# Patient Record
Sex: FEMALE | Race: BLACK OR AFRICAN AMERICAN | NOT HISPANIC OR LATINO | Employment: FULL TIME | URBAN - METROPOLITAN AREA
[De-identification: names, ages, dates, MRNs, and addresses within clinical notes are randomized per-mention and may not be internally consistent; named-entity substitution may affect disease eponyms.]

---

## 2019-05-20 ENCOUNTER — HOSPITAL ENCOUNTER (EMERGENCY)
Facility: HOSPITAL | Age: 24
Discharge: HOME | End: 2019-05-20
Attending: EMERGENCY MEDICINE
Payer: COMMERCIAL

## 2019-05-20 VITALS
BODY MASS INDEX: 38.86 KG/M2 | WEIGHT: 233.25 LBS | RESPIRATION RATE: 18 BRPM | SYSTOLIC BLOOD PRESSURE: 122 MMHG | HEIGHT: 65 IN | DIASTOLIC BLOOD PRESSURE: 86 MMHG | TEMPERATURE: 99 F | OXYGEN SATURATION: 98 % | HEART RATE: 100 BPM

## 2019-05-20 DIAGNOSIS — M65.4 DE QUERVAIN'S TENOSYNOVITIS, RIGHT: Primary | ICD-10-CM

## 2019-05-20 DIAGNOSIS — M25.531 RIGHT WRIST PAIN: ICD-10-CM

## 2019-05-20 PROCEDURE — 99283 EMERGENCY DEPT VISIT LOW MDM: CPT

## 2019-05-20 RX ORDER — DICLOFENAC SODIUM 75 MG/1
75 TABLET, DELAYED RELEASE ORAL 2 TIMES DAILY
Qty: 20 TABLET | Refills: 0 | Status: SHIPPED | OUTPATIENT
Start: 2019-05-20

## 2019-05-20 RX ORDER — TRAMADOL HYDROCHLORIDE 50 MG/1
50 TABLET ORAL EVERY 6 HOURS PRN
Qty: 12 TABLET | Refills: 0 | Status: SHIPPED | OUTPATIENT
Start: 2019-05-20

## 2019-05-20 NOTE — ED PROVIDER NOTES
SCRIBE #1 NOTE: I, Марина Hughes, am scribing for, and in the presence of, Heraclio Victor NP. I have scribed the entire note.      History      Chief Complaint   Patient presents with    Wrist Pain     c/o R wrist pain since Saturday, denies injury        Review of patient's allergies indicates:  No Known Allergies     HPI   HPI    5/20/2019, 3:39 PM   History obtained from the patient      History of Present Illness: Jovita Gomez is a 23 y.o. female patient who presents to the Emergency Department for evaluation of right wrist pain which onset gradually 3 days ago. Symptoms are constant and moderate in severity. Pt reports she is a . Pt denies any injury or trauma. No mitigating or exacerbating factors reported. Associated sxs includes numbness to right thumb. Patient denies any CP, SOB, fever, chills, n/v/d, and all other sxs at this time. Prior tx includes one of her grandmother's tramadol pills. No further complaints or concerns at this time.     Arrival mode: Personal vehicle      PCP: Primary Doctor No     Past Medical History:  Past medical history reviewed not relevant      Past Surgical History:  Past surgical history reviewed not relevant      Family History:  Family history reviewed not relevant      Social History:  Social History    Social History Main Topics    Social History Main Topics    Smoking status: Unknown if ever smoked    Smokeless tobacco: Unknown if ever used    Alcohol Use: Unknown drinking history    Drug Use: Unknown if ever used    Sexual Activity: Unknown         ROS   Review of Systems   Constitutional: Negative for chills and fever.   HENT: Negative for sore throat.    Respiratory: Negative for shortness of breath.    Cardiovascular: Negative for chest pain.   Gastrointestinal: Negative for abdominal pain, diarrhea, nausea and vomiting.   Genitourinary: Negative for dysuria.   Musculoskeletal: Negative for back pain and neck pain.        (+) R wrist pain    Skin: Negative for rash.   Neurological: Positive for numbness (R thumb). Negative for dizziness, syncope, weakness and headaches.   Hematological: Does not bruise/bleed easily.   All other systems reviewed and are negative.    Physical Exam      Initial Vitals   BP Pulse Resp Temp SpO2   05/20/19 1438 05/20/19 1437 05/20/19 1437 05/20/19 1437 05/20/19 1437   122/86 100 18 98.6 °F (37 °C) 98 %      MAP       --                 Physical Exam  Nursing Notes and Vital Signs Reviewed.  Constitutional: Patient is in no acute distress. Well-developed and well-nourished.  Head: Atraumatic. Normocephalic.  Eyes: PERRL. EOM intact. Conjunctivae are not pale. No scleral icterus.  ENT: Mucous membranes are moist. Oropharynx is clear and symmetric.    Neck: Supple. Full ROM. No lymphadenopathy.  Cardiovascular: Regular rate. Regular rhythm. No murmurs, rubs, or gallops. Distal pulses are 2+ and symmetric.  Pulmonary/Chest: No respiratory distress. Clear to auscultation bilaterally. No wheezing or rales.  Abdominal: Soft and non-distended.  There is no tenderness.  No rebound, guarding, or rigidity. Good bowel sounds.  Genitourinary: No CVA tenderness  Musculoskeletal: Moves all extremities. No obvious deformities. No edema. No calf tenderness.  Right Hand: No obvious deformity. Tenderness to radial aspect of the R wrist. Pain with flexion and extension of wrist. Tenderness to volar aspect of mid wrist. (+) Finkelstein test. Radial, median, and ulnar nerves are intact. Radial and ulnar pulses are 2+. Normal capillary refill.  Distal sensation is intact.  Skin: Warm and dry.  Neurological:  Alert, awake, and appropriate.  Normal speech.  No acute focal neurological deficits are appreciated.  Psychiatric: Normal affect. Good eye contact. Appropriate in content.    ED Course    Procedures  ED Vital Signs:  Vitals:    05/20/19 1437 05/20/19 1438   BP:  122/86   Pulse: 100    Resp: 18    Temp: 98.6 °F (37 °C)    TempSrc: Oral   "  SpO2: 98%    Weight: 105.8 kg (233 lb 4 oz)    Height: 5' 5" (1.651 m)                 The Emergency Provider reviewed the vital signs and test results, which are outlined above.    ED Discussion     3:45 PM Advised pt to f/u with Orthopedics if sxs worsen. Pt is awake, alert, and in NAD at this time. Discussed with pt all pertinent ED information and results. Discussed pt dx and plan of tx. Gave pt all f/u and return to the ED instructions. All questions and concerns were addressed at this time. Pt expresses understanding of information and instructions, and is comfortable with plan to discharge. Pt is stable for discharge.      ED Medication(s):  Medications - No data to display    Follow-up Information     [facility]    Specialty:  Orthopedics  Why:  If symptoms worsen  Contact information:  [id number] [facility] [address] [phone]  Additional information:  (off [facility]) 1st floor                   Medical Decision Making              Scribe Attestation:   Scribe #1: I performed the above scribed service and the documentation accurately describes the services I performed. I attest to the accuracy of the note.    Attending:   Physician Attestation Statement for Scribe #1: I, Heraclio Victor NP, personally performed the services described in this documentation, as scribed by Марина Hughes, in my presence, and it is both accurate and complete.          Clinical Impression       ICD-10-CM ICD-9-CM   1. De Quervain's tenosynovitis, right M65.4 727.04   2. Right wrist pain M25.531 719.43       Disposition:   Disposition: Discharged  Condition: Stable         Heraclio Victor Jr., [facility]  05/20/19 2157    "